# Patient Record
Sex: MALE | Race: WHITE | NOT HISPANIC OR LATINO | URBAN - METROPOLITAN AREA
[De-identification: names, ages, dates, MRNs, and addresses within clinical notes are randomized per-mention and may not be internally consistent; named-entity substitution may affect disease eponyms.]

---

## 2018-01-26 ENCOUNTER — NEW PATIENT (OUTPATIENT)
Dept: URBAN - METROPOLITAN AREA CLINIC 30 | Facility: CLINIC | Age: 73
End: 2018-01-26
Payer: COMMERCIAL

## 2018-01-26 DIAGNOSIS — H35.373 PUCKERING OF MACULA, BILATERAL: ICD-10-CM

## 2018-01-26 PROCEDURE — 92004 COMPRE OPH EXAM NEW PT 1/>: CPT | Performed by: OPTOMETRIST

## 2018-01-26 PROCEDURE — 92134 CPTRZ OPH DX IMG PST SGM RTA: CPT | Performed by: OPTOMETRIST

## 2018-01-26 ASSESSMENT — KERATOMETRY
OD: 42.59
OS: 42.94

## 2018-01-26 ASSESSMENT — VISUAL ACUITY
OD: 20/30
OS: 20/25

## 2018-01-26 ASSESSMENT — INTRAOCULAR PRESSURE
OD: 14
OS: 13

## 2018-03-06 ENCOUNTER — Encounter (OUTPATIENT)
Dept: URBAN - METROPOLITAN AREA CLINIC 30 | Facility: CLINIC | Age: 73
End: 2018-03-06
Payer: MEDICARE

## 2018-03-06 DIAGNOSIS — Z01.818 ENCOUNTER FOR OTHER PREPROCEDURAL EXAMINATION: Primary | ICD-10-CM

## 2018-03-06 PROCEDURE — 99213 OFFICE O/P EST LOW 20 MIN: CPT | Performed by: NURSE PRACTITIONER

## 2018-03-06 RX ORDER — ESCITALOPRAM 20 MG/1
20 MG TABLET, FILM COATED ORAL
Qty: 0 | Refills: 0 | Status: INACTIVE
Start: 2018-03-06 | End: 2021-08-17

## 2018-03-06 RX ORDER — LOSARTAN POTASSIUM 50 MG/1
50 MG TABLET ORAL
Qty: 0 | Refills: 0 | Status: INACTIVE
Start: 2018-03-06 | End: 2021-08-17

## 2018-03-06 RX ORDER — TERAZOSIN HYDROCHLORIDE ANHYDROUS 2 MG/1
2 MG CAPSULE ORAL
Qty: 0 | Refills: 0 | Status: INACTIVE
Start: 2018-03-06 | End: 2021-08-17

## 2018-03-06 RX ORDER — FINASTERIDE 5 MG/1
5 MG TABLET, FILM COATED ORAL
Qty: 0 | Refills: 0 | Status: INACTIVE
Start: 2018-03-06 | End: 2021-08-17

## 2018-03-12 ENCOUNTER — SURGERY (OUTPATIENT)
Dept: URBAN - METROPOLITAN AREA SURGERY 12 | Facility: SURGERY | Age: 73
End: 2018-03-12
Payer: MEDICARE

## 2018-03-12 PROCEDURE — 66821 AFTER CATARACT LASER SURGERY: CPT | Performed by: OPHTHALMOLOGY

## 2018-03-26 ENCOUNTER — SURGERY (OUTPATIENT)
Dept: URBAN - METROPOLITAN AREA SURGERY 12 | Facility: SURGERY | Age: 73
End: 2018-03-26
Payer: MEDICARE

## 2018-03-26 PROCEDURE — 66821 AFTER CATARACT LASER SURGERY: CPT | Performed by: OPHTHALMOLOGY

## 2018-05-11 ENCOUNTER — FOLLOW UP ESTABLISHED (OUTPATIENT)
Dept: URBAN - METROPOLITAN AREA CLINIC 30 | Facility: CLINIC | Age: 73
End: 2018-05-11
Payer: MEDICARE

## 2018-05-11 DIAGNOSIS — H26.493 OTHER SECONDARY CATARACT, BILATERAL: Primary | ICD-10-CM

## 2018-05-11 DIAGNOSIS — H43.813 VITREOUS DEGENERATION, BILATERAL: ICD-10-CM

## 2018-05-11 PROCEDURE — 92015 DETERMINE REFRACTIVE STATE: CPT | Performed by: OPTOMETRIST

## 2018-05-11 PROCEDURE — 92014 COMPRE OPH EXAM EST PT 1/>: CPT | Performed by: OPTOMETRIST

## 2018-05-11 ASSESSMENT — VISUAL ACUITY
OS: 20/25
OD: 20/25

## 2018-05-11 ASSESSMENT — INTRAOCULAR PRESSURE
OD: 13
OS: 12

## 2018-05-11 ASSESSMENT — KERATOMETRY
OS: 42.84
OD: 42.59

## 2019-06-20 ENCOUNTER — FOLLOW UP ESTABLISHED (OUTPATIENT)
Dept: URBAN - METROPOLITAN AREA CLINIC 30 | Facility: CLINIC | Age: 74
End: 2019-06-20
Payer: MEDICARE

## 2019-06-20 DIAGNOSIS — H04.123 TEAR FILM INSUFFICIENCY OF BILATERAL LACRIMAL GLANDS: ICD-10-CM

## 2019-06-20 DIAGNOSIS — Z96.1 PRESENCE OF INTRAOCULAR LENS: ICD-10-CM

## 2019-06-20 PROCEDURE — 92134 CPTRZ OPH DX IMG PST SGM RTA: CPT | Performed by: OPTOMETRIST

## 2019-06-20 PROCEDURE — 92014 COMPRE OPH EXAM EST PT 1/>: CPT | Performed by: OPTOMETRIST

## 2019-06-20 PROCEDURE — 83861 MICROFLUID ANALY TEARS: CPT | Performed by: OPTOMETRIST

## 2019-06-20 ASSESSMENT — KERATOMETRY
OS: 26.07
OD: 42.27

## 2019-06-20 ASSESSMENT — VISUAL ACUITY
OD: 20/25
OS: 20/25

## 2019-06-20 ASSESSMENT — INTRAOCULAR PRESSURE
OD: 14
OS: 15
OS: 13

## 2020-06-22 ENCOUNTER — FOLLOW UP ESTABLISHED (OUTPATIENT)
Dept: URBAN - METROPOLITAN AREA CLINIC 30 | Facility: CLINIC | Age: 75
End: 2020-06-22
Payer: MEDICARE

## 2020-06-22 PROCEDURE — 92134 CPTRZ OPH DX IMG PST SGM RTA: CPT | Performed by: OPTOMETRIST

## 2020-06-22 PROCEDURE — 92015 DETERMINE REFRACTIVE STATE: CPT | Performed by: OPTOMETRIST

## 2020-06-22 PROCEDURE — 92014 COMPRE OPH EXAM EST PT 1/>: CPT | Performed by: OPTOMETRIST

## 2020-06-22 ASSESSMENT — INTRAOCULAR PRESSURE
OS: 13
OD: 12

## 2020-06-22 ASSESSMENT — KERATOMETRY
OS: 42.83
OD: 42.56

## 2020-06-22 ASSESSMENT — VISUAL ACUITY
OD: 20/25
OS: 20/25

## 2021-08-17 ENCOUNTER — OFFICE VISIT (OUTPATIENT)
Dept: URBAN - METROPOLITAN AREA CLINIC 30 | Facility: CLINIC | Age: 76
End: 2021-08-17
Payer: MEDICARE

## 2021-08-17 PROCEDURE — 99213 OFFICE O/P EST LOW 20 MIN: CPT | Performed by: OPTOMETRIST

## 2021-08-17 PROCEDURE — 92134 CPTRZ OPH DX IMG PST SGM RTA: CPT | Performed by: OPTOMETRIST

## 2021-08-17 ASSESSMENT — INTRAOCULAR PRESSURE
OS: 14
OD: 14

## 2021-08-17 ASSESSMENT — VISUAL ACUITY
OS: 20/20
OD: 20/20

## 2021-08-17 NOTE — IMPRESSION/PLAN
Impression: Puckering of macula, bilateral Plan: remains stable. mild. Continue to monitor. No symptoms of metamorphopsia. MAC OCT stable OU.  Monitor

## 2021-08-17 NOTE — IMPRESSION/PLAN
Impression: Vitreous degeneration, bilateral Plan: remains Stable. Signs and symptoms of RD reviewed. Monitor yearly.

## 2022-08-20 ENCOUNTER — OFFICE VISIT (OUTPATIENT)
Dept: URBAN - METROPOLITAN AREA CLINIC 30 | Facility: CLINIC | Age: 77
End: 2022-08-20
Payer: MEDICARE

## 2022-08-20 DIAGNOSIS — H35.373 PUCKERING OF MACULA, BILATERAL: ICD-10-CM

## 2022-08-20 DIAGNOSIS — Z96.1 PRESENCE OF INTRAOCULAR LENS: ICD-10-CM

## 2022-08-20 DIAGNOSIS — H04.123 TEAR FILM INSUFFICIENCY OF BILATERAL LACRIMAL GLANDS: Primary | ICD-10-CM

## 2022-08-20 DIAGNOSIS — H43.813 VITREOUS DEGENERATION, BILATERAL: ICD-10-CM

## 2022-08-20 PROCEDURE — 92134 CPTRZ OPH DX IMG PST SGM RTA: CPT | Performed by: OPTOMETRIST

## 2022-08-20 PROCEDURE — 99213 OFFICE O/P EST LOW 20 MIN: CPT | Performed by: OPTOMETRIST

## 2022-08-20 ASSESSMENT — VISUAL ACUITY
OS: 20/20
OD: 20/20

## 2022-08-20 ASSESSMENT — INTRAOCULAR PRESSURE
OS: 14
OD: 14

## 2022-08-20 NOTE — IMPRESSION/PLAN
Impression: Vitreous degeneration, bilateral Plan: Again, remains Stable. Signs and symptoms of RD reviewed. Monitor yearly.

## 2022-08-20 NOTE — IMPRESSION/PLAN
Impression: Puckering of macula, bilateral Plan: Remains stable and mild. Continue to monitor. No symptoms of metamorphopsia. Monitor. See today's MAC OCT.

## 2022-08-20 NOTE — IMPRESSION/PLAN
Impression: Tear film insufficiency of bilateral lacrimal glands Plan: Continue Systane gtts QID OU. O3's. Avoid ceiling fans. Recommend OTC Lumify for hyperemia.

## 2025-07-11 ENCOUNTER — OFFICE VISIT (OUTPATIENT)
Dept: URBAN - METROPOLITAN AREA CLINIC 30 | Facility: CLINIC | Age: 80
End: 2025-07-11
Payer: MEDICARE

## 2025-07-11 DIAGNOSIS — H43.813 VITREOUS DEGENERATION, BILATERAL: ICD-10-CM

## 2025-07-11 DIAGNOSIS — H35.373 PUCKERING OF MACULA, BILATERAL: Primary | ICD-10-CM

## 2025-07-11 DIAGNOSIS — Z96.1 PRESENCE OF INTRAOCULAR LENS: ICD-10-CM

## 2025-07-11 DIAGNOSIS — H52.4 PRESBYOPIA: ICD-10-CM

## 2025-07-11 DIAGNOSIS — H04.123 TEAR FILM INSUFFICIENCY OF BILATERAL LACRIMAL GLANDS: ICD-10-CM

## 2025-07-11 PROCEDURE — 92134 CPTRZ OPH DX IMG PST SGM RTA: CPT | Performed by: OPTOMETRIST

## 2025-07-11 PROCEDURE — 92014 COMPRE OPH EXAM EST PT 1/>: CPT | Performed by: OPTOMETRIST

## 2025-07-11 ASSESSMENT — VISUAL ACUITY
OD: 20/30
OS: 20/25

## 2025-07-11 ASSESSMENT — INTRAOCULAR PRESSURE
OD: 14
OS: 15

## 2025-07-11 ASSESSMENT — KERATOMETRY
OS: 43.25
OD: 42.75